# Patient Record
Sex: FEMALE | Race: WHITE | NOT HISPANIC OR LATINO | Employment: UNEMPLOYED | URBAN - NONMETROPOLITAN AREA
[De-identification: names, ages, dates, MRNs, and addresses within clinical notes are randomized per-mention and may not be internally consistent; named-entity substitution may affect disease eponyms.]

---
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
## 2025-01-13 NOTE — PROGRESS NOTES
Subjective:     Patient ID: Rosalva Morton is a 19 y.o. female  PCP: Jose Garcia MD  Referring Provider: No ref. provider found    HPI  Patient presents to the office today with the following complaints: Follow-up      Patient seen in the office today for follow up from EGD and colonoscopy on 11/26/2024. She is currently taking Protonix 20 mg twice daily. She denies dysphagia or epigastric pain at this time. She does still have some nausea from time to time but nothing consistent. She is also taking lactulose 10 GM daily for constipation. She states her bowels are moving about every 2-3 days. Her stools are soft and not hard. She denies any abdominal pain as well.     Last EGD -11/26/2024- Dr Yu, (-) mariella, no clear-cut lesions to explain UGI symptoms    Last Colonoscopy -11/26/2024- Dr Yu, Mod diverticulosis left colon, int hem Gr 1 wo bleeding, 21 year recall     Paternal great grandmother-colon cancer  Paternal grandmother colon polyps and rectal cancer           Assessment:     1. Other constipation    2. Nausea    3. Gastroesophageal reflux disease without esophagitis    4. Grade I internal hemorrhoids              Plan:     Soft low-fat diet as tolerated with small frequent meals   Follow up in 3 months  Orders  No orders of the defined types were placed in this encounter.    Medications  No orders of the defined types were placed in this encounter.        Patient History:     Past Medical History:   Diagnosis Date    ADHD     Anxiety     Gastroparesis        Past Surgical History:   Procedure Laterality Date    COLONOSCOPY N/A 11/26/2024    Dr Yu, Mod diverticulosis left colon, int hem Gr 1 wo bleeding, 21 year recall    TONSILLECTOMY      UPPER GASTROINTESTINAL ENDOSCOPY  2023    Normal per pt    UPPER GASTROINTESTINAL ENDOSCOPY N/A 11/26/2024    Dr Yu, (-) mariella, no clear-cut lesions to explain UGI symptoms,       Family History   Problem Relation Age of

## 2025-01-14 ENCOUNTER — OFFICE VISIT (OUTPATIENT)
Dept: GASTROENTEROLOGY | Age: 20
End: 2025-01-14
Payer: COMMERCIAL

## 2025-01-14 VITALS
HEIGHT: 63 IN | DIASTOLIC BLOOD PRESSURE: 80 MMHG | SYSTOLIC BLOOD PRESSURE: 115 MMHG | OXYGEN SATURATION: 98 % | HEART RATE: 100 BPM | WEIGHT: 90 LBS | BODY MASS INDEX: 15.95 KG/M2

## 2025-01-14 DIAGNOSIS — K64.0 GRADE I INTERNAL HEMORRHOIDS: ICD-10-CM

## 2025-01-14 DIAGNOSIS — R11.0 NAUSEA: ICD-10-CM

## 2025-01-14 DIAGNOSIS — K21.9 GASTROESOPHAGEAL REFLUX DISEASE WITHOUT ESOPHAGITIS: ICD-10-CM

## 2025-01-14 DIAGNOSIS — K59.09 OTHER CONSTIPATION: Primary | ICD-10-CM

## 2025-01-14 PROCEDURE — G8419 CALC BMI OUT NRM PARAM NOF/U: HCPCS

## 2025-01-14 PROCEDURE — 99214 OFFICE O/P EST MOD 30 MIN: CPT

## 2025-01-14 PROCEDURE — G8427 DOCREV CUR MEDS BY ELIG CLIN: HCPCS

## 2025-01-14 PROCEDURE — 1036F TOBACCO NON-USER: CPT

## 2025-01-14 RX ORDER — LACTULOSE 10 G/10G
10 SOLUTION ORAL PRN
COMMUNITY

## 2025-01-14 ASSESSMENT — ENCOUNTER SYMPTOMS
COUGH: 0
VOMITING: 0
ABDOMINAL DISTENTION: 0
ALLERGIC/IMMUNOLOGIC NEGATIVE: 1
RESPIRATORY NEGATIVE: 1
ABDOMINAL PAIN: 0
NAUSEA: 1
BLOOD IN STOOL: 0
CHOKING: 0
EYES NEGATIVE: 1
TROUBLE SWALLOWING: 0
CONSTIPATION: 1
VOICE CHANGE: 0
DIARRHEA: 0

## 2025-04-12 NOTE — PROGRESS NOTES
Subjective:     Patient ID: Rosalva Morton is a 19 y.o. female  PCP: Jose Garcia MD  Referring Provider: No ref. provider found    HPI  Patient presents to the office today with the following complaints: 3 Month Follow-Up      Patient seen in the office today for follow up. She is taking protonix twice daily. She is still having some periumbilical pain along with nausea and vomiting. She would like to continue with gallbladder workup at this time. I instructed her to continue with current medications and follow up in 6 weeks.         1/14/2025 OV  Patient seen in the office today. She states she has generalized abdominal tenderness but more in the LLQ, constipation, she has also had a wt loss of 10-15 pounds per mom, and nausea. She describes her stools as \"1-2\" on the stool chart. She states she takes colace as needed and she took miralax in the past -did not work, stools are dark in color and sometimes bright red blood noted in toilet.  She states she has some nausea but the zofran she is on is taking care of it. She was recently put on Protonix 20 mg twice daily by her PCP and she states this has helped some. She states she has a history of gastroparesis. Her mom states she does not think this is an issue now. Her mom states she had a CT of abdomen and lab work in [place] at [facility] about a month ago.  Will call and get these results as well. There is a family history of colon cancer. Patient and family agree to an EGD and Colonoscopy at this time. I received her medical history from her and her mom today.     Last EGD -11/26/2024-Dr Yu, (-) sprminda, no clear-cut lesions to explain UGI symptoms    Last Colonoscopy -11/26/2024-Dr Yu, Mod diverticulosis left colon, int hem Gr 1 wo bleeding, 21 year recall     Paternal great grandmother-colon cancer  Paternal grandmother colon polyps and rectal cancer      Assessment:     1. Nausea and vomiting, unspecified vomiting type    2.

## 2025-04-14 ENCOUNTER — RESULTS FOLLOW-UP (OUTPATIENT)
Dept: GASTROENTEROLOGY | Age: 20
End: 2025-04-14

## 2025-04-14 ENCOUNTER — OFFICE VISIT (OUTPATIENT)
Dept: GASTROENTEROLOGY | Age: 20
End: 2025-04-14
Payer: COMMERCIAL

## 2025-04-14 VITALS
BODY MASS INDEX: 16.12 KG/M2 | OXYGEN SATURATION: 99 % | HEIGHT: 63 IN | HEART RATE: 100 BPM | WEIGHT: 91 LBS | DIASTOLIC BLOOD PRESSURE: 62 MMHG | SYSTOLIC BLOOD PRESSURE: 100 MMHG

## 2025-04-14 DIAGNOSIS — R11.2 NAUSEA AND VOMITING, UNSPECIFIED VOMITING TYPE: ICD-10-CM

## 2025-04-14 DIAGNOSIS — R11.2 NAUSEA AND VOMITING, UNSPECIFIED VOMITING TYPE: Primary | ICD-10-CM

## 2025-04-14 DIAGNOSIS — R10.33 PERIUMBILICAL ABDOMINAL PAIN: ICD-10-CM

## 2025-04-14 DIAGNOSIS — K57.90 DIVERTICULOSIS: ICD-10-CM

## 2025-04-14 DIAGNOSIS — K64.0 GRADE I INTERNAL HEMORRHOIDS: ICD-10-CM

## 2025-04-14 LAB
ALBUMIN SERPL-MCNC: 4.7 G/DL (ref 3.5–5.2)
ALP SERPL-CCNC: 85 U/L (ref 35–104)
ALT SERPL-CCNC: 20 U/L (ref 10–35)
ANION GAP SERPL CALCULATED.3IONS-SCNC: 11 MMOL/L (ref 8–16)
AST SERPL-CCNC: 24 U/L (ref 10–35)
BASOPHILS # BLD: 0 K/UL (ref 0–0.2)
BASOPHILS NFR BLD: 0.2 % (ref 0–1)
BILIRUB SERPL-MCNC: 0.4 MG/DL (ref 0.2–1.2)
BUN SERPL-MCNC: 13 MG/DL (ref 6–20)
CALCIUM SERPL-MCNC: 9.3 MG/DL (ref 8.6–10)
CHLORIDE SERPL-SCNC: 104 MMOL/L (ref 98–107)
CO2 SERPL-SCNC: 25 MMOL/L (ref 22–29)
CREAT SERPL-MCNC: 0.6 MG/DL (ref 0.5–0.9)
CRP SERPL-MCNC: <3 MG/L (ref 0–5)
EOSINOPHIL # BLD: 0 K/UL (ref 0–0.6)
EOSINOPHIL NFR BLD: 0.3 % (ref 0–5)
ERYTHROCYTE [DISTWIDTH] IN BLOOD BY AUTOMATED COUNT: 12.8 % (ref 11.5–14.5)
ERYTHROCYTE [SEDIMENTATION RATE] IN BLOOD BY WESTERGREN METHOD: 3 MM/HR (ref 0–20)
GLUCOSE SERPL-MCNC: 95 MG/DL (ref 70–99)
HCT VFR BLD AUTO: 44.9 % (ref 37–47)
HGB BLD-MCNC: 14.5 G/DL (ref 12–16)
IMM GRANULOCYTES # BLD: 0 K/UL
LIPASE SERPL-CCNC: 32 U/L (ref 13–60)
LYMPHOCYTES # BLD: 1.4 K/UL (ref 1.1–4.5)
LYMPHOCYTES NFR BLD: 16.7 % (ref 20–40)
MCH RBC QN AUTO: 29.5 PG (ref 27–31)
MCHC RBC AUTO-ENTMCNC: 32.3 G/DL (ref 33–37)
MCV RBC AUTO: 91.3 FL (ref 81–99)
MONOCYTES # BLD: 0.5 K/UL (ref 0–0.9)
MONOCYTES NFR BLD: 5.2 % (ref 0–10)
NEUTROPHILS # BLD: 6.7 K/UL (ref 1.5–7.5)
NEUTS SEG NFR BLD: 77.3 % (ref 50–65)
PLATELET # BLD AUTO: 282 K/UL (ref 130–400)
PMV BLD AUTO: 10.1 FL (ref 9.4–12.3)
POTASSIUM SERPL-SCNC: 4.3 MMOL/L (ref 3.5–5.1)
PROT SERPL-MCNC: 7.2 G/DL (ref 6.4–8.3)
RBC # BLD AUTO: 4.92 M/UL (ref 4.2–5.4)
SODIUM SERPL-SCNC: 140 MMOL/L (ref 136–145)
WBC # BLD AUTO: 8.6 K/UL (ref 4.8–10.8)

## 2025-04-14 PROCEDURE — 99214 OFFICE O/P EST MOD 30 MIN: CPT

## 2025-04-14 PROCEDURE — 1036F TOBACCO NON-USER: CPT

## 2025-04-14 PROCEDURE — G8427 DOCREV CUR MEDS BY ELIG CLIN: HCPCS

## 2025-04-14 PROCEDURE — G8419 CALC BMI OUT NRM PARAM NOF/U: HCPCS

## 2025-04-16 ENCOUNTER — HOSPITAL ENCOUNTER (OUTPATIENT)
Dept: ULTRASOUND IMAGING | Age: 20
Discharge: HOME | End: 2025-04-16
Payer: COMMERCIAL

## 2025-04-16 DIAGNOSIS — R11.2 NAUSEA AND VOMITING, UNSPECIFIED VOMITING TYPE: ICD-10-CM

## 2025-04-16 DIAGNOSIS — R10.33 PERIUMBILICAL ABDOMINAL PAIN: ICD-10-CM

## 2025-04-16 PROCEDURE — 76705 ECHO EXAM OF ABDOMEN: CPT

## 2025-04-16 ASSESSMENT — ENCOUNTER SYMPTOMS
ABDOMINAL PAIN: 1
NAUSEA: 1
CHOKING: 0
BLOOD IN STOOL: 0
VOMITING: 1
TROUBLE SWALLOWING: 0
ANAL BLEEDING: 0
ALLERGIC/IMMUNOLOGIC NEGATIVE: 1
RESPIRATORY NEGATIVE: 1
EYES NEGATIVE: 1

## 2025-05-13 ENCOUNTER — HOSPITAL ENCOUNTER (OUTPATIENT)
Dept: NUCLEAR MEDICINE | Age: 20
Discharge: HOME | End: 2025-05-15
Payer: COMMERCIAL

## 2025-05-13 DIAGNOSIS — R11.2 NAUSEA AND VOMITING, UNSPECIFIED VOMITING TYPE: ICD-10-CM

## 2025-05-13 DIAGNOSIS — R10.33 PERIUMBILICAL ABDOMINAL PAIN: ICD-10-CM

## 2025-05-13 PROCEDURE — 3430000000 HC RX DIAGNOSTIC RADIOPHARMACEUTICAL

## 2025-05-13 PROCEDURE — 78227 HEPATOBIL SYST IMAGE W/DRUG: CPT

## 2025-05-13 PROCEDURE — A9537 TC99M MEBROFENIN: HCPCS

## 2025-05-13 RX ADMIN — Medication 4 MILLICURIE: at 14:32

## 2025-05-14 NOTE — RESULT ENCOUNTER NOTE
Rosalva,   I have a sample of a medication in the office for the gastroparesis that you have. It is called Gimoti. Would you be willing to try it? It is a nasal spray that you do 2-3 times daily. Just let me know.

## 2025-05-15 NOTE — TELEPHONE ENCOUNTER
----- Message from KATIUSKA Schwarz CNP sent at 5/14/2025  8:29 AM CDT -----  Rosalva,   I have a sample of a medication in the office for the gastroparesis that you have. It is called Gimoti. Would you be willing to try it? It is a nasal spray that you do 2-3 times daily. Just let me know.

## 2025-06-06 ENCOUNTER — OFFICE VISIT (OUTPATIENT)
Dept: GASTROENTEROLOGY | Age: 20
End: 2025-06-06
Payer: COMMERCIAL

## 2025-06-06 VITALS
SYSTOLIC BLOOD PRESSURE: 110 MMHG | HEIGHT: 63 IN | BODY MASS INDEX: 15.77 KG/M2 | OXYGEN SATURATION: 98 % | DIASTOLIC BLOOD PRESSURE: 80 MMHG | WEIGHT: 89 LBS | HEART RATE: 100 BPM

## 2025-06-06 DIAGNOSIS — K31.84 GASTROPARESIS: ICD-10-CM

## 2025-06-06 DIAGNOSIS — R11.2 NAUSEA AND VOMITING, UNSPECIFIED VOMITING TYPE: ICD-10-CM

## 2025-06-06 DIAGNOSIS — K21.9 GASTROESOPHAGEAL REFLUX DISEASE WITHOUT ESOPHAGITIS: Primary | ICD-10-CM

## 2025-06-06 PROCEDURE — G8419 CALC BMI OUT NRM PARAM NOF/U: HCPCS

## 2025-06-06 PROCEDURE — G8427 DOCREV CUR MEDS BY ELIG CLIN: HCPCS

## 2025-06-06 PROCEDURE — 99213 OFFICE O/P EST LOW 20 MIN: CPT

## 2025-06-06 PROCEDURE — 1036F TOBACCO NON-USER: CPT

## 2025-06-06 RX ORDER — OMEPRAZOLE 40 MG/1
40 CAPSULE, DELAYED RELEASE ORAL DAILY
Qty: 30 CAPSULE | Refills: 11 | Status: SHIPPED | OUTPATIENT
Start: 2025-06-06

## 2025-06-06 RX ORDER — L.ACID/L.CASEI/B.BIF/B.LON/FOS 2B CELL-50
1 CAPSULE ORAL DAILY
Qty: 30 CAPSULE | Refills: 11 | Status: SHIPPED | OUTPATIENT
Start: 2025-06-06

## 2025-06-06 RX ORDER — FAMOTIDINE 20 MG/1
20 TABLET, FILM COATED ORAL NIGHTLY PRN
Qty: 60 TABLET | Refills: 3 | Status: SHIPPED | OUTPATIENT
Start: 2025-06-06

## 2025-06-06 NOTE — PROGRESS NOTES
Subjective:     Patient ID: Rosalva Morton is a 20 y.o. female  PCP: Jose Gacria MD  Referring Provider: No ref. provider found    HPI  History of Present Illness  The patient presents for evaluation of nausea, vomiting, and acid reflux.    She continues to experience persistent nausea and vomiting, despite the administration of Gimoti. She states the Gimoti made her even sicker. Dietary habits include small meals with occasional larger ones. Protonix has been taken for acid reflux but has not provided significant relief. Symptoms of acid reflux include a burning sensation and pain. She does not take any probiotics.    Gallbladder function is normal.  A sedentary lifestyle is noted, with time spent on her bed, beanbag chair, sofa, or floor, which may be contributing to her symptoms. She has attempted to elevate her bed using pillows.     I instructed her to increase her activity level, elevate HOB, pillow not sufficient, discussed anti-GERD measures, and how important it is to eat small meals versus larger ones. Her caregiver accompanied her and verbalized understanding as well. She is willing to try lifestyle changes versus adding medication at this time.     SOCIAL HISTORY  Exercise: Gentle walking after meals.  Diet: Small meals and occasional large meals.    Results  Last EGD: 11/26/2024- Dr Yu, (-) sprue, no clear-cut lesions to explain UGI symptoms    Last Colonoscopy: 11/26/2024- Dr Yu, Mod diverticulosis left colon, int hem Gr 1 wo bleeding, 21 year recall       Imaging:  -US gallbladder and HIDA scan both normal.     Assessment:     Assessment & Plan  1. Gastroparesis:  - Consume small meals daily instead of two to three large ones.  - Avoid fried, fatty, and spicy foods.  - Remain upright for at least two hours post-meal.  - Refrain from eating at least two hours before bedtime.  - Engage in gentle walking after meals to aid digestion.  - Elevate the head of the bed slightly to

## 2025-06-11 ASSESSMENT — ENCOUNTER SYMPTOMS
ABDOMINAL PAIN: 1
CHOKING: 0
RESPIRATORY NEGATIVE: 1
EYES NEGATIVE: 1
ALLERGIC/IMMUNOLOGIC NEGATIVE: 1
NAUSEA: 1
VOMITING: 1
TROUBLE SWALLOWING: 0

## (undated) DEVICE — SUPPLEMENT DIGESTIVE H2O SOL GI-EASE

## (undated) DEVICE — ADAPTER CLEANING PORPOISE CLEANING

## (undated) DEVICE — BRUSH ENDOSCP 2 END CHN HEDGEHOG

## (undated) DEVICE — FORCEPS BX 240CM 2.4MM L NDL RAD JAW 4 M00513334

## (undated) DEVICE — ENDO KIT,LOURDES HOSPITAL: Brand: MEDLINE INDUSTRIES, INC.

## (undated) DEVICE — BITE BLOCK ENDOSCP AD 60 FR W/ ADJ STRP PLAS GRN BLOX

## (undated) DEVICE — SINGLE PORT MANIFOLD: Brand: NEPTUNE 2

## (undated) DEVICE — CANNULA NSL AD L7FT DIV O2 CO2 W/ M LUERLOCK TRMPT CONN

## (undated) DEVICE — CLEANING BRUSH - SINGLE USE: Brand: SAFEGUIDE®

## (undated) DEVICE — CLEANING SPONGE: Brand: KOALA™